# Patient Record
Sex: MALE | Race: WHITE | Employment: FULL TIME | URBAN - METROPOLITAN AREA
[De-identification: names, ages, dates, MRNs, and addresses within clinical notes are randomized per-mention and may not be internally consistent; named-entity substitution may affect disease eponyms.]

---

## 2021-07-30 ENCOUNTER — OFFICE VISIT (OUTPATIENT)
Dept: FAMILY MEDICINE CLINIC | Age: 31
End: 2021-07-30
Payer: COMMERCIAL

## 2021-07-30 VITALS
HEART RATE: 80 BPM | BODY MASS INDEX: 34.79 KG/M2 | OXYGEN SATURATION: 99 % | HEIGHT: 70 IN | DIASTOLIC BLOOD PRESSURE: 72 MMHG | SYSTOLIC BLOOD PRESSURE: 112 MMHG | TEMPERATURE: 96.7 F | WEIGHT: 243 LBS

## 2021-07-30 DIAGNOSIS — Z01.818 PRE-OP EVALUATION: Primary | ICD-10-CM

## 2021-07-30 PROCEDURE — 99243 OFF/OP CNSLTJ NEW/EST LOW 30: CPT | Performed by: NURSE PRACTITIONER

## 2021-07-30 SDOH — ECONOMIC STABILITY: FOOD INSECURITY: WITHIN THE PAST 12 MONTHS, THE FOOD YOU BOUGHT JUST DIDN'T LAST AND YOU DIDN'T HAVE MONEY TO GET MORE.: OFTEN TRUE

## 2021-07-30 SDOH — ECONOMIC STABILITY: FOOD INSECURITY: WITHIN THE PAST 12 MONTHS, YOU WORRIED THAT YOUR FOOD WOULD RUN OUT BEFORE YOU GOT MONEY TO BUY MORE.: OFTEN TRUE

## 2021-07-30 ASSESSMENT — PATIENT HEALTH QUESTIONNAIRE - PHQ9
2. FEELING DOWN, DEPRESSED OR HOPELESS: 0
SUM OF ALL RESPONSES TO PHQ QUESTIONS 1-9: 0
1. LITTLE INTEREST OR PLEASURE IN DOING THINGS: 0
SUM OF ALL RESPONSES TO PHQ QUESTIONS 1-9: 0
SUM OF ALL RESPONSES TO PHQ QUESTIONS 1-9: 0
SUM OF ALL RESPONSES TO PHQ9 QUESTIONS 1 & 2: 0

## 2021-07-30 ASSESSMENT — SOCIAL DETERMINANTS OF HEALTH (SDOH): HOW HARD IS IT FOR YOU TO PAY FOR THE VERY BASICS LIKE FOOD, HOUSING, MEDICAL CARE, AND HEATING?: NOT HARD AT ALL

## 2021-07-30 NOTE — PROGRESS NOTES
Preoperative Consultation      Yimi Lara  YOB: 1990    Date of Service:  7/30/2021    Vitals:    07/30/21 1440   BP: 112/72   Site: Right Upper Arm   Position: Sitting   Cuff Size: Large Adult   Pulse: 80   Temp: 96.7 °F (35.9 °C)   TempSrc: Temporal   SpO2: 99%   Weight: 243 lb (110.2 kg)   Height: 5' 10\" (1.778 m)      Wt Readings from Last 2 Encounters:   07/30/21 243 lb (110.2 kg)     BP Readings from Last 3 Encounters:   07/30/21 112/72        Chief Complaint   Patient presents with   Jean Self Pre-op Exam     Patient for pre-op for DR. RIVERO EXCISION PALATAL MASS SURGERY ON 8-3-21     No Known Allergies  No outpatient medications have been marked as taking for the 7/30/21 encounter (Office Visit) with Karla Fofana. Jody Jules, APRN - CNP. This patient presents to the office today for a preoperative consultation at the request of surgeon, Dr. Chase Day, who plans on performing Excision Palatal Mass on August 3 at [facility]. The current problem began 3 years ago, and symptoms gave been worsening with time. Conservative therapy: N/A. Planned anesthesia: General   Known anesthesia problems: None   Bleeding risk: No recent or remote history of abnormal bleeding  Personal or FH of DVT/PE: No    Patient objection to receiving blood products: No    There is no problem list on file for this patient. Past Medical History:   Diagnosis Date    Ankle sprain 2007    Left ankle sprain Dr. Alexsander Roldan    Pain 2015    [facility] [place] joint pain     Past Surgical History:   Procedure Laterality Date    KNEE ARTHROSCOPY  2008    Left Knee Arthroscopy     History reviewed. No pertinent family history.   Social History     Socioeconomic History    Marital status:      Spouse name: Not on file    Number of children: Not on file    Years of education: Not on file    Highest education level: Not on file   Occupational History    Not on file   Tobacco Use    Smoking status: Never Smoker    Smokeless tobacco: Never Used   Substance and Sexual Activity    Alcohol use: Not on file    Drug use: Not on file    Sexual activity: Not on file   Other Topics Concern    Not on file   Social History Narrative    Not on file     Social Determinants of Health     Financial Resource Strain: Low Risk     Difficulty of Paying Living Expenses: Not hard at all   Food Insecurity: Food Insecurity Present    Worried About [address] [employer] in the Last Year: Often true    Jaida of Food in the Last Year: Often true   Transportation Needs:     Lack of Transportation (Medical):  Lack of Transportation (Non-Medical):    Physical Activity:     Days of Exercise per Week:     Minutes of Exercise per Session:    Stress:     Feeling of Stress :    Social Connections:     Frequency of Communication with Friends and Family:     Frequency of Social Gatherings with Friends and Family:     Attends Jain Services:     Active Member of Clubs or Organizations:     Attends Club or Organization Meetings:     Marital Status:    Intimate Partner Violence:     Fear of Current or Ex-Partner:     Emotionally Abused:     Physically Abused:     Sexually Abused:        Review of Systems  A comprehensive review of systems was negative except for what was noted in the HPI. Physical Exam   Constitutional: He is oriented to person, place, and time. He appears well-developed and well-nourished. No distress. HENT:   Head: Normocephalic and atraumatic. Mouth/Throat: Uvula is midline, oropharynx is clear and moist and mucous membranes are normal.   Eyes: Conjunctivae and EOM are normal. Pupils are equal, round, and reactive to light. Neck: Trachea normal and normal range of motion. Neck supple. No JVD present. Carotid bruit is not present. No mass and no thyromegaly present. Cardiovascular: Normal rate, regular rhythm, normal heart sounds and intact distal pulses. Exam reveals no gallop and no friction rub.   No murmur heard.  Pulmonary/Chest: Effort normal and breath sounds normal. No respiratory distress. He has no wheezes. He has no rales. Abdominal: Soft. Normal aorta and bowel sounds are normal. He exhibits no distension and no mass. There is no hepatosplenomegaly. No tenderness. Musculoskeletal: He exhibits no edema and no tenderness. Neurological: He is alert and oriented to person, place, and time. He has normal strength. No cranial nerve deficit or sensory deficit. Coordination and gait normal.   Skin: Skin is warm and dry. No rash noted. No erythema. Psychiatric: He has a normal mood and affect. His behavior is normal.     EKG Interpretation:  N/A. Lab Review N/A        Assessment:       35 y.o. patient with planned surgery as above. Known risk factors for perioperative complications: None  Current medications which may produce withdrawal symptoms if withheld perioperatively: none      Plan:     1. Preoperative workup as follows: none  2. Change in medication regimen before surgery: N/A- not on any medications  3. Prophylaxis for cardiac events with perioperative beta-blockers: Not indicated  ACC/AHA indications for pre-operative beta-blocker use:    · Vascular surgery with history of postitive stress test  · Intermediate or high risk surgery with history of CAD   · Intermediate or high risk surgery with multiple clinical predictors of CAD- 2 of the following: history of compensated or prior heart failure, history of cerebrovascular disease, DM, or renal insufficiency    Routine administration of higher-dose, long-acting metoprolol in beta-blockernaïve patients on the day of surgery, and in the absence of dose titration is associated with an overall increase in mortality. Beta-blockers should be started days to weeks prior to surgery and titrated to pulse < 70.  4. Deep vein thrombosis prophylaxis: regimen to be chosen by surgical team  5.  Please review the above to proceed with surgery

## 2021-08-03 ENCOUNTER — ANESTHESIA EVENT (OUTPATIENT)
Dept: OPERATING ROOM | Age: 31
End: 2021-08-03
Payer: COMMERCIAL

## 2021-08-03 ENCOUNTER — HOSPITAL ENCOUNTER (OUTPATIENT)
Age: 31
Setting detail: OUTPATIENT SURGERY
Discharge: HOME | End: 2021-08-03
Attending: OTOLARYNGOLOGY | Admitting: OTOLARYNGOLOGY
Payer: COMMERCIAL

## 2021-08-03 ENCOUNTER — ANESTHESIA (OUTPATIENT)
Dept: OPERATING ROOM | Age: 31
End: 2021-08-03
Payer: COMMERCIAL

## 2021-08-03 VITALS
SYSTOLIC BLOOD PRESSURE: 153 MMHG | HEART RATE: 80 BPM | BODY MASS INDEX: 34.79 KG/M2 | DIASTOLIC BLOOD PRESSURE: 73 MMHG | TEMPERATURE: 98 F | HEIGHT: 70 IN | RESPIRATION RATE: 16 BRPM | WEIGHT: 243 LBS | OXYGEN SATURATION: 97 %

## 2021-08-03 VITALS — SYSTOLIC BLOOD PRESSURE: 140 MMHG | TEMPERATURE: 93.9 F | DIASTOLIC BLOOD PRESSURE: 84 MMHG | OXYGEN SATURATION: 99 %

## 2021-08-03 DIAGNOSIS — G89.18 POST-OP PAIN: Primary | ICD-10-CM

## 2021-08-03 PROCEDURE — 2500000003 HC RX 250 WO HCPCS: Performed by: OTOLARYNGOLOGY

## 2021-08-03 PROCEDURE — 3700000000 HC ANESTHESIA ATTENDED CARE: Performed by: OTOLARYNGOLOGY

## 2021-08-03 PROCEDURE — 2580000003 HC RX 258: Performed by: NURSE ANESTHETIST, CERTIFIED REGISTERED

## 2021-08-03 PROCEDURE — 2580000003 HC RX 258: Performed by: OTOLARYNGOLOGY

## 2021-08-03 PROCEDURE — 7100000011 HC PHASE II RECOVERY - ADDTL 15 MIN: Performed by: OTOLARYNGOLOGY

## 2021-08-03 PROCEDURE — 2709999900 HC NON-CHARGEABLE SUPPLY: Performed by: OTOLARYNGOLOGY

## 2021-08-03 PROCEDURE — 7100000010 HC PHASE II RECOVERY - FIRST 15 MIN: Performed by: OTOLARYNGOLOGY

## 2021-08-03 PROCEDURE — 7100000000 HC PACU RECOVERY - FIRST 15 MIN: Performed by: OTOLARYNGOLOGY

## 2021-08-03 PROCEDURE — 6370000000 HC RX 637 (ALT 250 FOR IP): Performed by: OTOLARYNGOLOGY

## 2021-08-03 PROCEDURE — 7100000001 HC PACU RECOVERY - ADDTL 15 MIN: Performed by: OTOLARYNGOLOGY

## 2021-08-03 PROCEDURE — 6360000002 HC RX W HCPCS: Performed by: NURSE ANESTHETIST, CERTIFIED REGISTERED

## 2021-08-03 PROCEDURE — 3600000012 HC SURGERY LEVEL 2 ADDTL 15MIN: Performed by: OTOLARYNGOLOGY

## 2021-08-03 PROCEDURE — 3700000001 HC ADD 15 MINUTES (ANESTHESIA): Performed by: OTOLARYNGOLOGY

## 2021-08-03 PROCEDURE — 3600000002 HC SURGERY LEVEL 2 BASE: Performed by: OTOLARYNGOLOGY

## 2021-08-03 PROCEDURE — 88305 TISSUE EXAM BY PATHOLOGIST: CPT

## 2021-08-03 PROCEDURE — 2500000003 HC RX 250 WO HCPCS: Performed by: NURSE ANESTHETIST, CERTIFIED REGISTERED

## 2021-08-03 RX ORDER — SODIUM CHLORIDE 0.9 % (FLUSH) 0.9 %
5-40 SYRINGE (ML) INJECTION EVERY 12 HOURS SCHEDULED
Status: DISCONTINUED | OUTPATIENT
Start: 2021-08-03 | End: 2021-08-03 | Stop reason: HOSPADM

## 2021-08-03 RX ORDER — FENTANYL CITRATE 50 UG/ML
INJECTION, SOLUTION INTRAMUSCULAR; INTRAVENOUS PRN
Status: DISCONTINUED | OUTPATIENT
Start: 2021-08-03 | End: 2021-08-03 | Stop reason: SDUPTHER

## 2021-08-03 RX ORDER — ROCURONIUM BROMIDE 10 MG/ML
INJECTION, SOLUTION INTRAVENOUS PRN
Status: DISCONTINUED | OUTPATIENT
Start: 2021-08-03 | End: 2021-08-03 | Stop reason: SDUPTHER

## 2021-08-03 RX ORDER — SUCCINYLCHOLINE/SOD CL,ISO/PF 100 MG/5ML
SYRINGE (ML) INTRAVENOUS PRN
Status: DISCONTINUED | OUTPATIENT
Start: 2021-08-03 | End: 2021-08-03 | Stop reason: SDUPTHER

## 2021-08-03 RX ORDER — SODIUM CHLORIDE, SODIUM LACTATE, POTASSIUM CHLORIDE, CALCIUM CHLORIDE 600; 310; 30; 20 MG/100ML; MG/100ML; MG/100ML; MG/100ML
INJECTION, SOLUTION INTRAVENOUS CONTINUOUS
Status: DISCONTINUED | OUTPATIENT
Start: 2021-08-03 | End: 2021-08-03 | Stop reason: HOSPADM

## 2021-08-03 RX ORDER — SODIUM CHLORIDE, SODIUM LACTATE, POTASSIUM CHLORIDE, CALCIUM CHLORIDE 600; 310; 30; 20 MG/100ML; MG/100ML; MG/100ML; MG/100ML
INJECTION, SOLUTION INTRAVENOUS CONTINUOUS PRN
Status: DISCONTINUED | OUTPATIENT
Start: 2021-08-03 | End: 2021-08-03 | Stop reason: SDUPTHER

## 2021-08-03 RX ORDER — HYDROCODONE BITARTRATE AND ACETAMINOPHEN 5; 325 MG/1; MG/1
1 TABLET ORAL EVERY 6 HOURS PRN
Qty: 20 TABLET | Refills: 0 | Status: SHIPPED | OUTPATIENT
Start: 2021-08-03 | End: 2021-08-10

## 2021-08-03 RX ORDER — DEXMEDETOMIDINE HYDROCHLORIDE 100 UG/ML
INJECTION, SOLUTION INTRAVENOUS PRN
Status: DISCONTINUED | OUTPATIENT
Start: 2021-08-03 | End: 2021-08-03 | Stop reason: SDUPTHER

## 2021-08-03 RX ORDER — LIDOCAINE HYDROCHLORIDE AND EPINEPHRINE 10; 10 MG/ML; UG/ML
INJECTION, SOLUTION INFILTRATION; PERINEURAL PRN
Status: DISCONTINUED | OUTPATIENT
Start: 2021-08-03 | End: 2021-08-03 | Stop reason: ALTCHOICE

## 2021-08-03 RX ORDER — HYDROCODONE BITARTRATE AND ACETAMINOPHEN 5; 325 MG/1; MG/1
1 TABLET ORAL EVERY 6 HOURS PRN
Status: DISCONTINUED | OUTPATIENT
Start: 2021-08-03 | End: 2021-08-03 | Stop reason: HOSPADM

## 2021-08-03 RX ORDER — MAGNESIUM HYDROXIDE 1200 MG/15ML
LIQUID ORAL CONTINUOUS PRN
Status: COMPLETED | OUTPATIENT
Start: 2021-08-03 | End: 2021-08-03

## 2021-08-03 RX ORDER — PROPOFOL 10 MG/ML
INJECTION, EMULSION INTRAVENOUS PRN
Status: DISCONTINUED | OUTPATIENT
Start: 2021-08-03 | End: 2021-08-03 | Stop reason: SDUPTHER

## 2021-08-03 RX ORDER — SODIUM CHLORIDE 9 MG/ML
25 INJECTION, SOLUTION INTRAVENOUS PRN
Status: DISCONTINUED | OUTPATIENT
Start: 2021-08-03 | End: 2021-08-03 | Stop reason: HOSPADM

## 2021-08-03 RX ORDER — LIDOCAINE HYDROCHLORIDE 20 MG/ML
INJECTION, SOLUTION EPIDURAL; INFILTRATION; INTRACAUDAL; PERINEURAL PRN
Status: DISCONTINUED | OUTPATIENT
Start: 2021-08-03 | End: 2021-08-03 | Stop reason: SDUPTHER

## 2021-08-03 RX ORDER — SODIUM CHLORIDE 0.9 % (FLUSH) 0.9 %
5-40 SYRINGE (ML) INJECTION PRN
Status: DISCONTINUED | OUTPATIENT
Start: 2021-08-03 | End: 2021-08-03 | Stop reason: HOSPADM

## 2021-08-03 RX ORDER — MIDAZOLAM HYDROCHLORIDE 1 MG/ML
INJECTION INTRAMUSCULAR; INTRAVENOUS PRN
Status: DISCONTINUED | OUTPATIENT
Start: 2021-08-03 | End: 2021-08-03 | Stop reason: SDUPTHER

## 2021-08-03 RX ORDER — LIDOCAINE HYDROCHLORIDE 10 MG/ML
1 INJECTION, SOLUTION EPIDURAL; INFILTRATION; INTRACAUDAL; PERINEURAL ONCE
Status: COMPLETED | OUTPATIENT
Start: 2021-08-03 | End: 2021-08-03

## 2021-08-03 RX ADMIN — PROPOFOL 200 MG: 10 INJECTION, EMULSION INTRAVENOUS at 09:47

## 2021-08-03 RX ADMIN — LIDOCAINE HYDROCHLORIDE 1 ML: 10 INJECTION, SOLUTION EPIDURAL; INFILTRATION; INTRACAUDAL; PERINEURAL at 08:40

## 2021-08-03 RX ADMIN — SODIUM CHLORIDE, POTASSIUM CHLORIDE, SODIUM LACTATE AND CALCIUM CHLORIDE: 600; 310; 30; 20 INJECTION, SOLUTION INTRAVENOUS at 08:40

## 2021-08-03 RX ADMIN — LIDOCAINE HYDROCHLORIDE 100 MG: 20 INJECTION, SOLUTION EPIDURAL; INFILTRATION; INTRACAUDAL; PERINEURAL at 09:47

## 2021-08-03 RX ADMIN — MIDAZOLAM HYDROCHLORIDE 2 MG: 2 INJECTION, SOLUTION INTRAMUSCULAR; INTRAVENOUS at 09:42

## 2021-08-03 RX ADMIN — FENTANYL CITRATE 100 MCG: 50 INJECTION, SOLUTION INTRAMUSCULAR; INTRAVENOUS at 09:47

## 2021-08-03 RX ADMIN — Medication 100 MG: at 09:47

## 2021-08-03 RX ADMIN — ROCURONIUM BROMIDE 10 MG: 10 INJECTION INTRAVENOUS at 09:47

## 2021-08-03 RX ADMIN — ROCURONIUM BROMIDE 40 MG: 10 INJECTION INTRAVENOUS at 09:52

## 2021-08-03 RX ADMIN — HYDROCODONE BITARTRATE AND ACETAMINOPHEN 1 TABLET: 5; 325 TABLET ORAL at 11:42

## 2021-08-03 RX ADMIN — SODIUM CHLORIDE, POTASSIUM CHLORIDE, SODIUM LACTATE AND CALCIUM CHLORIDE: 600; 310; 30; 20 INJECTION, SOLUTION INTRAVENOUS at 09:42

## 2021-08-03 RX ADMIN — SUGAMMADEX 400 MG: 100 INJECTION, SOLUTION INTRAVENOUS at 10:01

## 2021-08-03 RX ADMIN — DEXMEDETOMIDINE HCL 20 MCG: 100 INJECTION INTRAVENOUS at 10:03

## 2021-08-03 ASSESSMENT — PULMONARY FUNCTION TESTS
PIF_VALUE: 16
PIF_VALUE: 20
PIF_VALUE: 1
PIF_VALUE: 15
PIF_VALUE: 0
PIF_VALUE: 21
PIF_VALUE: 21
PIF_VALUE: 31
PIF_VALUE: 0
PIF_VALUE: 18
PIF_VALUE: 32
PIF_VALUE: 20
PIF_VALUE: 19
PIF_VALUE: 1
PIF_VALUE: 2
PIF_VALUE: 2
PIF_VALUE: 20
PIF_VALUE: 0
PIF_VALUE: 2
PIF_VALUE: 3
PIF_VALUE: 20
PIF_VALUE: 1
PIF_VALUE: 17

## 2021-08-03 ASSESSMENT — PAIN SCALES - GENERAL: PAINLEVEL_OUTOF10: 5

## 2021-08-03 NOTE — BRIEF OP NOTE
Brief Postoperative Note      Patient: Renaldo Stewart  YOB: 1990  MRN: [id number]    Date of Procedure: 8/3/2021    Pre-Op Diagnosis: PALATAL MASS    Post-Op Diagnosis: Same       Procedure(s):  EXCISION PALATAL MASS    Surgeon(s):   Jennifer Israel MD    Assistant:  * No surgical staff found *    Anesthesia: General    Estimated Blood Loss (mL): Minimal    Complications: None    Specimens:   ID Type Source Tests Collected by Time Destination   A : PALATAL MASS Tissue Mouth SURGICAL PATHOLOGY Jennifer Israel MD 8/3/2021 1000        Implants:  * No implants in log *      Drains: * No LDAs found *    Findings: mass entirely removed    Electronically signed by Jennifer Israel MD on 8/3/2021 at 10:12 AM

## 2021-08-03 NOTE — ANESTHESIA PRE PROCEDURE
Department of Anesthesiology  Preprocedure Note       Name:  Lynette Ni   Age:  35 y.o.  :  1990                                          MRN:  [id number]         Date:  8/3/2021      Surgeon: Maryam Tapia): Niki Arevalo MD    Procedure: Procedure(s):  EXCISION PALATAL MASS, 30 MINS , PAT NEMO WALK-IN, TO BE LATEX FREE    Medications prior to admission:   Prior to Admission medications    Not on File       Current medications:    Current Facility-Administered Medications   Medication Dose Route Frequency Provider Last Rate Last Admin    lactated ringers infusion   Intravenous Continuous Niki Arevalo  mL/hr at 21 0840 New Bag at 21 0840       Allergies:  No Known Allergies    Problem List:  There is no problem list on file for this patient.       Past Medical History:        Diagnosis Date    Ankle sprain 2007    Left ankle sprain Dr. Aretha Courtney    Pain 2015    [facility] joint pain       Past Surgical History:        Procedure Laterality Date    KNEE ARTHROSCOPY  2008    Left Knee Arthroscopy       Social History:    Social History     Tobacco Use    Smoking status: Never Smoker    Smokeless tobacco: Never Used   Substance Use Topics    Alcohol use: Not on file                                Counseling given: Not Answered      Vital Signs (Current):   Vitals:    21 0835   BP: (!) 139/98   Pulse: 64   Resp: 16   Temp: 97.6 °F (36.4 °C)   TempSrc: Temporal   SpO2: 97%   Weight: 243 lb (110.2 kg)   Height: 5' 10\" (1.778 m)                                              BP Readings from Last 3 Encounters:   21 (!) 139/98   21 112/72       NPO Status: Time of last liquid consumption:                         Time of last solid consumption:                         Date of last liquid consumption: 21                        Date of last solid food consumption: 21    BMI:   Wt Readings from Last 3 Encounters:   21 243 lb (110.2 kg)   21 243 lb (110.2 kg)     Body mass index is 34.87 kg/m². CBC: No results found for: WBC, RBC, HGB, HCT, MCV, RDW, PLT    CMP: No results found for: NA, K, CL, CO2, BUN, CREATININE, GFRAA, AGRATIO, LABGLOM, GLUCOSE, PROT, CALCIUM, BILITOT, ALKPHOS, AST, ALT    POC Tests: No results for input(s): POCGLU, POCNA, POCK, POCCL, POCBUN, POCHEMO, POCHCT in the last 72 hours. Coags: No results found for: PROTIME, INR, APTT    HCG (If Applicable): No results found for: PREGTESTUR, PREGSERUM, HCG, HCGQUANT     ABGs: No results found for: PHART, PO2ART, JTC1BEJ, RYV1AHN, BEART, V4EVSVSA     Type & Screen (If Applicable):  No results found for: LABABO, LABRH    Drug/Infectious Status (If Applicable):  No results found for: HIV, HEPCAB    COVID-19 Screening (If Applicable): No results found for: COVID19        Anesthesia Evaluation  Patient summary reviewed and Nursing notes reviewed no history of anesthetic complications:   Airway: Mallampati: II  TM distance: >3 FB   Neck ROM: full  Mouth opening: > = 3 FB Dental: normal exam         Pulmonary:Negative Pulmonary ROS and normal exam                               Cardiovascular:Negative CV ROS  Exercise tolerance: good (>4 METS),         ECG reviewed               Beta Blocker:  Not on Beta Blocker         Neuro/Psych:   Negative Neuro/Psych ROS              GI/Hepatic/Renal: Neg GI/Hepatic/Renal ROS            Endo/Other: Negative Endo/Other ROS             Pt had PAT visit. Abdominal:             Vascular: negative vascular ROS. Other Findings:             Anesthesia Plan      general     ASA 1     (ETT)  Induction: intravenous. MIPS: Postoperative opioids intended and Prophylactic antiemetics administered. Anesthetic plan and risks discussed with patient. Plan discussed with CRNA.     Attending anesthesiologist reviewed and agrees with Pre Eval content and Attending anesthesiologist reviewed and agrees with Preprocedure content              Rohit Celestin,

## 2021-08-03 NOTE — ANESTHESIA POSTPROCEDURE EVALUATION
Department of Anesthesiology  Postprocedure Note    Patient: Bipin Razo  MRN: [id number]  YOB: 1990  Date of evaluation: 8/3/2021  Time:  10:11 AM     Procedure Summary     Date: 08/03/21 Room / Location: [address]    Anesthesia Start: 6704 Anesthesia Stop: 1010    Procedure: EXCISION PALATAL MASS (N/A ) Diagnosis: (PALATAL MASS)    Surgeons: Austen Doss MD Responsible Provider: Jami Stuart MD    Anesthesia Type: general ASA Status: 1          Anesthesia Type: general    Yelitza Phase I:      Yelitza Phase II:      Last vitals: Reviewed and per EMR flowsheets.        Anesthesia Post Evaluation    Patient location during evaluation: PACU  Patient participation: complete - patient participated  Level of consciousness: awake  Pain score: 0  Airway patency: patent  Nausea & Vomiting: no nausea and no vomiting  Complications: no  Cardiovascular status: blood pressure returned to baseline and hemodynamically stable  Respiratory status: acceptable and nasal cannula  Hydration status: stable

## 2024-11-01 ENCOUNTER — LAB (OUTPATIENT)
Dept: LAB | Facility: LAB | Age: 34
End: 2024-11-01
Payer: COMMERCIAL

## 2024-11-01 ENCOUNTER — OFFICE VISIT (OUTPATIENT)
Dept: PRIMARY CARE | Facility: CLINIC | Age: 34
End: 2024-11-01
Payer: COMMERCIAL

## 2024-11-01 VITALS
BODY MASS INDEX: 35.22 KG/M2 | TEMPERATURE: 97.6 F | WEIGHT: 246 LBS | SYSTOLIC BLOOD PRESSURE: 128 MMHG | HEART RATE: 67 BPM | DIASTOLIC BLOOD PRESSURE: 75 MMHG | OXYGEN SATURATION: 96 % | RESPIRATION RATE: 16 BRPM | HEIGHT: 70 IN

## 2024-11-01 DIAGNOSIS — R10.30 LOWER ABDOMINAL PAIN: Primary | ICD-10-CM

## 2024-11-01 DIAGNOSIS — R39.11 URINARY HESITANCY: ICD-10-CM

## 2024-11-01 DIAGNOSIS — R10.30 LOWER ABDOMINAL PAIN: ICD-10-CM

## 2024-11-01 LAB
ALBUMIN SERPL BCP-MCNC: 5.1 G/DL (ref 3.4–5)
ALP SERPL-CCNC: 50 U/L (ref 33–120)
ALT SERPL W P-5'-P-CCNC: 29 U/L (ref 10–52)
ANION GAP SERPL CALC-SCNC: 12 MMOL/L (ref 10–20)
AST SERPL W P-5'-P-CCNC: 17 U/L (ref 9–39)
BASOPHILS # BLD AUTO: 0.05 X10*3/UL (ref 0–0.1)
BASOPHILS NFR BLD AUTO: 0.9 %
BILIRUB SERPL-MCNC: 1.2 MG/DL (ref 0–1.2)
BUN SERPL-MCNC: 16 MG/DL (ref 6–23)
CALCIUM SERPL-MCNC: 9.9 MG/DL (ref 8.6–10.3)
CHLORIDE SERPL-SCNC: 103 MMOL/L (ref 98–107)
CO2 SERPL-SCNC: 30 MMOL/L (ref 21–32)
CREAT SERPL-MCNC: 0.89 MG/DL (ref 0.5–1.3)
EGFRCR SERPLBLD CKD-EPI 2021: >90 ML/MIN/1.73M*2
EOSINOPHIL # BLD AUTO: 0.12 X10*3/UL (ref 0–0.7)
EOSINOPHIL NFR BLD AUTO: 2.2 %
ERYTHROCYTE [DISTWIDTH] IN BLOOD BY AUTOMATED COUNT: 13.1 % (ref 11.5–14.5)
GLUCOSE SERPL-MCNC: 96 MG/DL (ref 74–99)
HCT VFR BLD AUTO: 48.4 % (ref 41–52)
HGB BLD-MCNC: 15.9 G/DL (ref 13.5–17.5)
IMM GRANULOCYTES # BLD AUTO: 0.01 X10*3/UL (ref 0–0.7)
IMM GRANULOCYTES NFR BLD AUTO: 0.2 % (ref 0–0.9)
LIPASE SERPL-CCNC: 18 U/L (ref 9–82)
LYMPHOCYTES # BLD AUTO: 1.79 X10*3/UL (ref 1.2–4.8)
LYMPHOCYTES NFR BLD AUTO: 32.6 %
MCH RBC QN AUTO: 29.8 PG (ref 26–34)
MCHC RBC AUTO-ENTMCNC: 32.9 G/DL (ref 32–36)
MCV RBC AUTO: 91 FL (ref 80–100)
MONOCYTES # BLD AUTO: 0.45 X10*3/UL (ref 0.1–1)
MONOCYTES NFR BLD AUTO: 8.2 %
NEUTROPHILS # BLD AUTO: 3.07 X10*3/UL (ref 1.2–7.7)
NEUTROPHILS NFR BLD AUTO: 55.9 %
NRBC BLD-RTO: 0 /100 WBCS (ref 0–0)
PLATELET # BLD AUTO: 214 X10*3/UL (ref 150–450)
POTASSIUM SERPL-SCNC: 5 MMOL/L (ref 3.5–5.3)
PROT SERPL-MCNC: 7.4 G/DL (ref 6.4–8.2)
PSA SERPL-MCNC: 0.47 NG/ML
RBC # BLD AUTO: 5.33 X10*6/UL (ref 4.5–5.9)
SODIUM SERPL-SCNC: 140 MMOL/L (ref 136–145)
WBC # BLD AUTO: 5.5 X10*3/UL (ref 4.4–11.3)

## 2024-11-01 PROCEDURE — 80053 COMPREHEN METABOLIC PANEL: CPT

## 2024-11-01 PROCEDURE — 36415 COLL VENOUS BLD VENIPUNCTURE: CPT

## 2024-11-01 PROCEDURE — 84153 ASSAY OF PSA TOTAL: CPT

## 2024-11-01 PROCEDURE — 85025 COMPLETE CBC W/AUTO DIFF WBC: CPT

## 2024-11-01 PROCEDURE — 83690 ASSAY OF LIPASE: CPT

## 2024-11-08 ENCOUNTER — DOCUMENTATION (OUTPATIENT)
Dept: PRIMARY CARE | Facility: CLINIC | Age: 34
End: 2024-11-08
Payer: COMMERCIAL

## 2024-11-08 NOTE — PROGRESS NOTES
Called patient to let him know his CT abdomen pelvis has been approved after peer to peer. He expresses understanding, has it scheduled for next week.  Danielle Lassiter, DO

## 2024-11-14 ENCOUNTER — HOSPITAL ENCOUNTER (OUTPATIENT)
Dept: RADIOLOGY | Facility: HOSPITAL | Age: 34
Discharge: HOME | End: 2024-11-14
Payer: COMMERCIAL

## 2024-11-14 DIAGNOSIS — R39.11 URINARY HESITANCY: ICD-10-CM

## 2024-11-14 DIAGNOSIS — R10.30 LOWER ABDOMINAL PAIN: ICD-10-CM

## 2024-11-14 PROCEDURE — 74177 CT ABD & PELVIS W/CONTRAST: CPT

## 2024-11-14 PROCEDURE — 2550000001 HC RX 255 CONTRASTS

## 2024-11-20 ASSESSMENT — ENCOUNTER SYMPTOMS
FLATUS: 0
HEMATOCHEZIA: 0
CONSTIPATION: 0
MYALGIAS: 0
HEMATURIA: 0
DYSURIA: 1
VOMITING: 0
ARTHRALGIAS: 0
HEADACHES: 0
BELCHING: 0
NAUSEA: 0
ABDOMINAL PAIN: 1
FEVER: 0
ANOREXIA: 0
DIARRHEA: 0
WEIGHT LOSS: 0
FREQUENCY: 0

## 2024-11-25 ENCOUNTER — APPOINTMENT (OUTPATIENT)
Dept: PRIMARY CARE | Facility: CLINIC | Age: 34
End: 2024-11-25
Payer: COMMERCIAL

## 2024-11-25 VITALS
RESPIRATION RATE: 16 BRPM | HEART RATE: 74 BPM | TEMPERATURE: 98.2 F | OXYGEN SATURATION: 96 % | WEIGHT: 250 LBS | DIASTOLIC BLOOD PRESSURE: 70 MMHG | SYSTOLIC BLOOD PRESSURE: 108 MMHG | BODY MASS INDEX: 35.62 KG/M2

## 2024-11-25 DIAGNOSIS — R10.30 LOWER ABDOMINAL PAIN: ICD-10-CM

## 2024-11-25 DIAGNOSIS — I86.1 VARICOCELE: Primary | ICD-10-CM

## 2024-11-25 PROCEDURE — 1036F TOBACCO NON-USER: CPT

## 2024-11-25 PROCEDURE — 99213 OFFICE O/P EST LOW 20 MIN: CPT

## 2024-11-25 ASSESSMENT — ENCOUNTER SYMPTOMS
DIARRHEA: 0
ABDOMINAL PAIN: 1
ARTHRALGIAS: 0
HEMATOCHEZIA: 0
HEADACHES: 0
ANOREXIA: 0
FLATUS: 0
FREQUENCY: 0
NAUSEA: 0
FEVER: 0
CONSTIPATION: 0
MYALGIAS: 0
HEMATURIA: 0
VOMITING: 0
BELCHING: 0
WEIGHT LOSS: 0
DYSURIA: 1

## 2024-11-25 NOTE — PROGRESS NOTES
Subjective   Patient ID: Tommy Milton is a 34 y.o. male who presents for Follow-up (Abd pain).    Abdominal pain  The pain is similar, slightly better. Sharp stabbing pain just left of his umbilicus. Happens about twice per week at 8/10 with lifting or bowel movements/straining.  He has noticed improvement after taking a break from the gym. symptoms resolved after about an hour without intervention, he has not been using any medications or heat.  He has been trying to stretch but cannot reproduce that symptoms and has not noticed much relief from this.  Bowel movements are consistently twice a day, more firm than soft. No urinary changes, current hesitancy, blood.  He does endorse mild frequency but attributes this to drinking lots of water because of kidney stones.  He does have a history of varicocele, previously seeing urology, last follow up was about 6 years ago.  He occasionally has scrotal tenderness on the left but this does not always correlate with his abdominal pain.    Abdominal Pain  This is a recurrent problem. The current episode started more than 1 year ago. The onset quality is sudden. The problem occurs every several days. The most recent episode lasted 1 hours. The problem has been unchanged. The pain is located in the LLQ, LUQ and left flank. The pain is at a severity of 9/10. The quality of the pain is sharp and tearing. The abdominal pain radiates to the perineum. Associated symptoms include dysuria. Pertinent negatives include no anorexia, arthralgias, belching, constipation, diarrhea, fever, flatus, frequency, headaches, hematochezia, hematuria, melena, myalgias, nausea, vomiting or weight loss. Nothing aggravates the pain. The pain is relieved by Nothing. Prior diagnostic workup includes CT scan.        Review of Systems   Constitutional:  Negative for fever and weight loss.   Gastrointestinal:  Positive for abdominal pain. Negative for anorexia, constipation, diarrhea, flatus,  hematochezia, melena, nausea and vomiting.   Genitourinary:  Positive for dysuria. Negative for frequency and hematuria.   Musculoskeletal:  Negative for arthralgias and myalgias.   Neurological:  Negative for headaches.       Objective   /70 (BP Location: Left arm, Patient Position: Sitting, BP Cuff Size: Large adult)   Pulse 74   Temp 36.8 °C (98.2 °F)   Resp 16   Wt 113 kg (250 lb)   SpO2 96%   BMI 35.62 kg/m²     Physical Exam  Constitutional:       Appearance: He is not ill-appearing.   Eyes:      General: No scleral icterus.     Conjunctiva/sclera: Conjunctivae normal.   Cardiovascular:      Rate and Rhythm: Normal rate and regular rhythm.   Pulmonary:      Effort: Pulmonary effort is normal.      Breath sounds: No wheezing, rhonchi or rales.   Abdominal:      General: There is no distension.      Palpations: Abdomen is soft. There is no mass.      Tenderness: There is no abdominal tenderness. There is no guarding.      Hernia: No hernia is present.   Musculoskeletal:         General: No swelling.   Skin:     General: Skin is warm and dry.      Coloration: Skin is not jaundiced.   Neurological:      General: No focal deficit present.      Mental Status: He is alert.   Psychiatric:         Mood and Affect: Mood normal.         Assessment & Plan  Lower abdominal pain  Persistent abdominal pain related to straining with overall reassuring CT, however urologic concerns with varicocele and nephrolithiasis.  Presentation is concerning for possible groin strain versus urologic pathology.  Plan to follow-up with urology for further evaluation and management.  Recommended MiraLAX to help with straining of bowel movements.  Recommended continuing exercise and modifying workouts to avoid exacerbating pain.  If urologic workup is unrevealing, we could consider GI workup/referral.  Follow-up after urology appointment.  Orders:    Follow Up In [facility] - PCP - Established    Varicocele  History of  varicocele previously seeing urology, currently intermittently symptomatic.  Recommended follow-up with urology, as well as for nephrolithiasis, borderline prostate, and history of intermittent urinary hesitancy.  Orders:    Referral to Urology; Future      Danielle Lassiter DO  Family Medicine Resident, PGY2       no known allergies

## 2024-11-26 NOTE — PROGRESS NOTES
I reviewed the resident/fellow's documentation and discussed the patient with the resident. I agree with the resident medical decision making as documented in the note.   Patient had this recurrent abdominal pain for over a year.  We did review the CAT scan.  He still experiencing symptoms.  Will have him see urology.  They do not think the abnormalities on TV is contributing to his symptoms we will have him see GI for possible scope.  He is get a follow-up in 1 month  If any increase symptoms any worsening symptoms any nausea vomiting fever chills any increased symptoms notify the office or go to the ER  Agree with assessment and plan  Marshall Lane, DO

## 2025-01-19 NOTE — PROGRESS NOTES
Subjective   Patient ID: Tommy Milton is a 34 y.o. male who presents for   HPI  Are you experiencing:  Burning on urination --  Pain on urination  --  Urinary frequency --  Urinary urgency --  Urge incontinence --  Urinary stress incontinence  --   Number of pads used per day --  Enuresis --   Nocturia--  Hematuria --  Hesitancy --  Post void fullness --       Review of Systems  General-- No C/O fever or chills  Head-- No C/O Dizziness  Eyes-- NO  C/O blurry or double vision  Ears-- No C/O hearing loss  Neck-- Supple  Chest-- No C/O pain or discomfort  Lungs-- No C/O shortness of breath  Abdomen-- No C/O  pain or discomfort, No nausea or vomiting  Back-- No C/O back pain or discomfort  Extremities-- No C/O swelling or pain    Objective   Physical Exam    General-- well developed, well nourished in NAD  Head-- normal cephalic, atraumatic  Eyes-- PERRL, EOM'S FROM,  no  jaundice  Neck-- Supple, without masses  Chest-- Normal bony structure  Abdomen-- soft, non tender, liver spleen not palpable . No supra pubic masses  Back-- no flank masses palpable, no CVA tenderness on palpation or perc;ussion  Lymph nodes-- No inguinal lymphadenopathy noted  Prostate-- +, firm, smooth, non tender,without nodules  Testis-- both down, non tender, without masses  Epididymis-- no masses palpable  Scrotum -- no hydrocele noted  Extremities -- Normal muscle mass and tone for the patients age  Neurological-- oriented times three    11-21-24  CAT SCAN PF THE ABD AND PELVIS:  IMPRESSION:  1. Bilateral nonobstructing renal calculi.  2. Borderline size to slightly enlarged prostate.      Assessment/Plan     Nick Odell MD 01/19/25 1:40 PM

## 2025-01-22 ENCOUNTER — APPOINTMENT (OUTPATIENT)
Dept: UROLOGY | Facility: CLINIC | Age: 35
End: 2025-01-22
Payer: COMMERCIAL

## 2025-03-09 NOTE — PROGRESS NOTES
Subjective   Patient ID: Tommy Milton is a 34 y.o. male who presents for EVALUATION OF KIDNEY STONES  FOUND ON A RECENT SCAN DONE  ON 11-1-24 FOR EVALUATION OF ABD PAIN.  PT HAD HIS FIRST KIDNEY STONE AT 19 YRS OF AG.  THE PAIN  HAS RESOLVED ON ITS OWN OVER TIME . PT WILL OCC EXPERIENCE LEFT SIDED ABD PAIN WHEN HE WORKS OUT.   GRANDFATHER ON FATHERS SIDE HAD PROSTATE CANCR  HPI:  Are you experiencing:  Burning on urination -- NO  Pain on urination  -- NO  Urinary frequency -- NO  Urinary urgency - OCC  Urge incontinence -- NO  Urinary stress incontinence  -- NO  Number of pads used per day --NONE  Enuresis -- NO  Nocturia-- NO  Hematuria --NO  Hesitancy -- NO  Post void fullness -- NO  Strength of your stream-- GOOD    ROS:  General-- No C/O fever or chills  Head-- No C/O Dizziness  Eyes-- NO  C/O blurry or double vision  Ears-- No C/O hearing loss  Neck-- Supple  Chest-- No C/O pain or discomfort  Lungs-- No C/O shortness of breath  Abdomen-- No C/O  pain or discomfort, No nausea or vomiting  Back-- No C/O back pain or discomfort  Extremities-- No C/O swelling or pain    OBJECTIVE  General-- well-developed, well-nourished in NAD  Head-- normal cephalic, atraumatic  Eyes-- PERRL, EOM'S FROM, no jaundice  Neck-- Supple, without masses  Chest-- Normal bony structure  Abdomen-- soft, non tender, liver spleen not palpable. No suprapubic masses.  Back-- no flank masses palpable, no CVA tenderness on palpation or percussion  Lymph nodes-- No inguinal lymphadenopathy noted  Prostate-- 1+, firm, smooth, non-tender, without nodules  Testis-- both down, non-tender, without masses  Epididymis-- no masses palpable  Scrotum -- no hydrocele noted  Extremities -- Normal muscle mass and tone for the patients age  Neurological-- oriented times three    11-1-24  CAT SCAN OF THE ABD AND PELVIS:  KIDNEYS AND URETERS:  5 mm anterior right renal cortical cyst (image 67 series 201 and  coronal image 50). An extremely tiny hypoattenuating  cortical lesion  superolaterally in the left kidney on image 55 series 201 may be a  cyst, but is too small to characterize. At least 4 left renal  calcifications the largest of which is 4 mm and at least 2 tiny right  renal calculi are probably calyceal. Normal size kidneys without  hydroureteronephrosis, a mass or a perinephric stranding.  IMPRESSION:  1. Bilateral nonobstructing renal calculi.  2. Borderline size to slightly enlarged prostate.        ASSESSMENT / PLAN  A:  BILATERAL RENAL CALCULI -- ASYMPTOMATIC   OPTIONS OF FURTHER EVALUALTION DICUSSED IE:  1) GO FORWARD WITH A METABOLIC STONE W/U  2) CONSERVATIVE THERAPY RE: THE  KIDNEY STONES AND F/U ON AN OPEN DOOR POLICY    REMOTE H/O ABD PAIN ? ETIOLOGY- IT HAS RESOLVED OVER TIME ON ITS OWN    NORMAL FEELING PROSTATE  P:  CONSERVATIVE THERAPY  RE: HIS RENAL CALCULI PER THE PT'S REQUEST   F/U  ON AN OPEN DOOR POLICY  Nick Odell MD 03/09/25 1:16 PM

## 2025-03-12 ENCOUNTER — OFFICE VISIT (OUTPATIENT)
Dept: UROLOGY | Facility: CLINIC | Age: 35
End: 2025-03-12
Payer: COMMERCIAL

## 2025-03-12 VITALS
HEART RATE: 69 BPM | BODY MASS INDEX: 34.9 KG/M2 | SYSTOLIC BLOOD PRESSURE: 147 MMHG | WEIGHT: 245 LBS | TEMPERATURE: 98.4 F | DIASTOLIC BLOOD PRESSURE: 87 MMHG

## 2025-03-12 DIAGNOSIS — I86.1 VARICOCELE: ICD-10-CM

## 2025-03-12 DIAGNOSIS — N20.0 KIDNEY STONES: Primary | ICD-10-CM

## 2025-03-12 PROCEDURE — 99204 OFFICE O/P NEW MOD 45 MIN: CPT | Performed by: UROLOGY

## 2025-03-12 PROCEDURE — 1036F TOBACCO NON-USER: CPT | Performed by: UROLOGY

## 2025-03-12 PROCEDURE — 99214 OFFICE O/P EST MOD 30 MIN: CPT | Performed by: UROLOGY

## 2025-03-12 NOTE — LETTER
March 12, 2025     Danielle Lassiter [place]  [id number] [facility] [id number]    Patient: Tommy Milton   YOB: 1990   Date of Visit: 3/12/2025       Dear Dr. Danielle Lassiter DO:    Thank you for referring Tommy Milton to me for evaluation. Below are my notes for this consultation.  If you have questions, please do not hesitate to call me. I look forward to following your patient along with you.       Sincerely,     Nick Odell MD      CC: No Recipients  ______________________________________________________________________________________    Subjective  Patient ID: Tommy Milton is a 34 y.o. male who presents for EVALUATION OF KIDNEY STONES  FOUND ON A RECENT SCAN DONE  ON 11-1-24 FOR EVALUATION OF ABD PAIN.  PT HAD HIS FIRST KIDNEY STONE AT 19 YRS OF AG.  THE PAIN  HAS RESOLVED ON ITS OWN OVER TIME . PT WILL OCC EXPERIENCE LEFT SIDED ABD PAIN WHEN HE WORKS OUT.   GRANDFATHER ON FATHERS SIDE HAD PROSTATE CANCR  HPI:  Are you experiencing:  Burning on urination -- NO  Pain on urination  -- NO  Urinary frequency -- NO  Urinary urgency - OCC  Urge incontinence -- NO  Urinary stress incontinence  -- NO  Number of pads used per day --NONE  Enuresis -- NO  Nocturia-- NO  Hematuria --NO  Hesitancy -- NO  Post void fullness -- NO  Strength of your stream-- GOOD    ROS:  General-- No C/O fever or chills  Head-- No C/O Dizziness  Eyes-- NO  C/O blurry or double vision  Ears-- No C/O hearing loss  Neck-- Supple  Chest-- No C/O pain or discomfort  Lungs-- No C/O shortness of breath  Abdomen-- No C/O  pain or discomfort, No nausea or vomiting  Back-- No C/O back pain or discomfort  Extremities-- No C/O swelling or pain    OBJECTIVE  General-- well-developed, well-nourished in NAD  Head-- normal cephalic, atraumatic  Eyes-- PERRL, EOM'S FROM, no jaundice  Neck-- Supple, without masses  Chest-- Normal bony structure  Abdomen-- soft, non tender, liver spleen not palpable. No suprapubic masses.  Back-- no flank  masses palpable, no CVA tenderness on palpation or percussion  Lymph nodes-- No inguinal lymphadenopathy noted  Prostate-- 1+, firm, smooth, non-tender, without nodules  Testis-- both down, non-tender, without masses  Epididymis-- no masses palpable  Scrotum -- no hydrocele noted  Extremities -- Normal muscle mass and tone for the patients age  Neurological-- oriented times three    11-1-24  CAT SCAN OF THE ABD AND PELVIS:  KIDNEYS AND URETERS:  5 mm anterior right renal cortical cyst (image 67 series 201 and  coronal image 50). An extremely tiny hypoattenuating cortical lesion  superolaterally in the left kidney on image 55 series 201 may be a  cyst, but is too small to characterize. At least 4 left renal  calcifications the largest of which is 4 mm and at least 2 tiny right  renal calculi are probably calyceal. Normal size kidneys without  hydroureteronephrosis, a mass or a perinephric stranding.  IMPRESSION:  1. Bilateral nonobstructing renal calculi.  2. Borderline size to slightly enlarged prostate.        ASSESSMENT / PLAN  A:  BILATERAL RENAL CALCULI -- ASYMPTOMATIC   OPTIONS OF FURTHER EVALUALTION DICUSSED IE:  1) GO FORWARD WITH A METABOLIC STONE W/U  2) CONSERVATIVE THERAPY RE: THE  KIDNEY STONES AND F/U ON AN OPEN DOOR POLICY    REMOTE H/O ABD PAIN ? ETIOLOGY- IT HAS RESOLVED OVER TIME ON ITS OWN    NORMAL FEELING PROSTATE  P:  CONSERVATIVE THERAPY  RE: HIS RENAL CALCULI PER THE PT'S REQUEST   F/U  ON AN OPEN DOOR POLICY  Nick Odell MD 03/09/25 1:16 PM

## (undated) DEVICE — NEEDLE HYPO 25GA L1.5IN BLU POLYPR HUB S STL REG BVL STR

## (undated) DEVICE — BLADE ES ELASTOMERIC COAT INSUL DURABLE BEND UPTO 90DEG

## (undated) DEVICE — DBD-PACK,EENT,SIRUS,PK II: Brand: MEDLINE

## (undated) DEVICE — GLOVE ORANGE PI 7 1/2   MSG9075

## (undated) DEVICE — NEPTUNE E-SEP SMOKE EVACUATION PENCIL, COATED, 70MM BLADE, PUSH BUTTON SWITCH: Brand: NEPTUNE E-SEP

## (undated) DEVICE — TOWEL,OR,DSP,ST,BLUE,STD,4/PK,20PK/CS: Brand: MEDLINE

## (undated) DEVICE — TUBING, SUCTION, 1/4" X 10', STRAIGHT: Brand: MEDLINE

## (undated) DEVICE — CORD,CAUTERY,BIPOLAR,STERILE: Brand: MEDLINE

## (undated) DEVICE — YANKAUER,BULB TIP,W/O VENT,RIGID,STERILE: Brand: MEDLINE

## (undated) DEVICE — LABEL MED MINI W/ MARKER

## (undated) DEVICE — GAUZE,SPONGE,4"X4",16PLY,XRAY,STRL,LF: Brand: MEDLINE

## (undated) DEVICE — SINGLE PORT MANIFOLD: Brand: NEPTUNE 2

## (undated) DEVICE — SYRINGE MED 10ML LUERLOCK TIP W/O SFTY DISP

## (undated) DEVICE — SYRINGE IRRIG 60ML SFT PLIABLE BLB EZ TO GRP 1 HND USE W/

## (undated) DEVICE — ELECTRODE NDL 2.8IN COAT VALLEYLAB